# Patient Record
Sex: MALE | Race: WHITE | Employment: FULL TIME | URBAN - METROPOLITAN AREA
[De-identification: names, ages, dates, MRNs, and addresses within clinical notes are randomized per-mention and may not be internally consistent; named-entity substitution may affect disease eponyms.]

---

## 2017-02-05 ENCOUNTER — OFFICE VISIT (OUTPATIENT)
Dept: URGENT CARE | Facility: RETAIL CLINIC | Age: 32
End: 2017-02-05
Payer: COMMERCIAL

## 2017-02-05 VITALS
TEMPERATURE: 98.3 F | OXYGEN SATURATION: 97 % | HEART RATE: 96 BPM | SYSTOLIC BLOOD PRESSURE: 130 MMHG | DIASTOLIC BLOOD PRESSURE: 80 MMHG

## 2017-02-05 DIAGNOSIS — H10.33 ACUTE BACTERIAL CONJUNCTIVITIS OF BOTH EYES: Primary | ICD-10-CM

## 2017-02-05 DIAGNOSIS — J01.90 ACUTE SINUSITIS WITH SYMPTOMS > 10 DAYS: ICD-10-CM

## 2017-02-05 PROCEDURE — 99213 OFFICE O/P EST LOW 20 MIN: CPT | Performed by: PHYSICIAN ASSISTANT

## 2017-02-05 RX ORDER — CIPROFLOXACIN HYDROCHLORIDE 3.5 MG/ML
2 SOLUTION/ DROPS TOPICAL 3 TIMES DAILY
Qty: 5 ML | Refills: 0 | Status: SHIPPED | OUTPATIENT
Start: 2017-02-05 | End: 2017-02-12

## 2017-02-05 NOTE — Clinical Note
[facility]  [address] [facility] [address]  Phone: [phone]    February 5, 2017        Gerson Truong  [id number] [address] [place] [id number]          To whom it may concern:    RE: Gerson Truong    Patient was seen and treated today at our clinic for bacterial pink eye/conjunctivitis. Please excuse from work today 2/5/17 due to being contagious for 24 hrs.    Please contact me for questions or concerns.      Sincerely,        Cyndi Melo PA-C

## 2017-02-05 NOTE — MR AVS SNAPSHOT
After Visit Summary   2/5/2017    Gerson Truong    MRN: [id number]           Patient Information     Date Of Birth          1985        Visit Information        Provider Department      2/5/2017 10:20 AM Cyndi Melo PA-C [facility]        Today's Diagnoses     Acute bacterial conjunctivitis of both eyes    -  1     Acute sinusitis with symptoms > 10 days           Care Instructions    For bacterial pink eye   Use eye drops as directed for 7 days   Out of work/activities for at least 24 hrs due to being contagious.   Handwashing     items touched   Wear Glasses this week if possible  Toss contacts lens  Sterile lens case  Paper towels at all sinks at home  Wipe discharge away with wet paper towel.   Please follow up with primary care provider if not improving, worsening or new symptoms or for any adverse reactions to medications.      For sinusitis/bronchitis  Take antibiotic as directed  Apply warm facial compresses/packs for 5-10 minutes three times daily.  Drink plenty of fluids- 6 to 10 glasses of liquids each day. Rest.  Saline drops or nasal sprays as needed.   Steam treatments or humidifier.  Sudafed (decongestant) for congestion.  Mucinex (guaifenesin) to thin out secretions.  Tylenol or ibuprofen as needed for pain or fever  Follow up at your primary care clinic for increasing pain, high fever, vision changes, worsening symptoms, or no relief from symptoms after 7-10 days.  Please follow up with primary care provider if not improving, worsening or new symptoms or for any adverse reactions to medications.              Follow-ups after your visit        Who to contact     You can reach your care team any time of the day by calling [phone].  Notification of test results:  If you have an abnormal lab result, we will notify you by phone as soon as possible.         Additional Information About Your Visit        MyChart Information     MyChart lets  "you send messages to your doctor, view your test results, renew your prescriptions, schedule appointments and more. To sign up, go to [email or web address] . Click on \"Log in\" on the left side of the screen, which will take you to the Welcome page. Then click on \"Sign up Now\" on the right side of the page.     You will be asked to enter the access code listed below, as well as some personal information. Please follow the directions to create your username and password.     Your access code is: [email or web address]  Expires: 2017 10:40 AM     Your access code will  in 90 days. If you need help or a new code, please call your [facility] clinic or [phone].        Care EveryWhere ID     This is your Care EveryWhere ID. This could be used by other organizations to access your [facility] medical records  [id number]        Your Vitals Were     Pulse Temperature Pulse Oximetry             96 98.3  F (36.8  C) (Oral) 97%          Blood Pressure from Last 3 Encounters:   17 130/80   03/10/15 127/67   14 110/80    Weight from Last 3 Encounters:   14 270 lb 6.4 oz (122.653 kg)   12 282 lb (127.914 kg)              Today, you had the following     No orders found for display         Today's Medication Changes          These changes are accurate as of: 17 10:40 AM.  If you have any questions, ask your nurse or doctor.               Start taking these medicines.        Dose/Directions    amoxicillin-clavulanate 875-125 MG per tablet   Commonly known as:  AUGMENTIN   Used for:  Acute sinusitis with symptoms > 10 days        Dose:  1 tablet   Take 1 tablet by mouth 2 times daily for 10 days   Quantity:  20 tablet   Refills:  0       ciprofloxacin 0.3 % ophthalmic solution   Commonly known as:  CILOXAN   Used for:  Acute bacterial conjunctivitis of both eyes        Dose:  2 drop   Place 2 drops into both eyes 3 times daily for 7 days   Quantity:  5 mL   Refills:  0            Where to get your " medicines      These medications were sent to [id number] - [place], [place] - [address] [facility]  [address] [facility], [facility] [address]     Phone:  [phone]    - amoxicillin-clavulanate 875-125 MG per tablet  - ciprofloxacin 0.3 % ophthalmic solution             Primary Care Provider Office Phone # Fax #    SIOBHAN Patel [phone]       XXX RESIGNED XXX [address] [facility] [address]        Thank you!     Thank you for choosing [facility]  for your care. Our goal is always to provide you with excellent care. Hearing back from our patients is one way we can continue to improve our services. Please take a few minutes to complete the written survey that you may receive in the mail after your visit with us. Thank you!             Your Updated Medication List - Protect others around you: Learn how to safely use, store and throw away your medicines at www.disposemymeds.org.          This list is accurate as of: 2/5/17 10:40 AM.  Always use your most recent med list.                   Brand Name Dispense Instructions for use    amoxicillin-clavulanate 875-125 MG per tablet    AUGMENTIN    20 tablet    Take 1 tablet by mouth 2 times daily for 10 days       ciprofloxacin 0.3 % ophthalmic solution    CILOXAN    5 mL    Place 2 drops into both eyes 3 times daily for 7 days       MUCINEX PO

## 2017-02-05 NOTE — PATIENT INSTRUCTIONS
For bacterial pink eye   Use eye drops as directed for 7 days   Out of work/activities for at least 24 hrs due to being contagious.   Handwashing     items touched   Wear Glasses this week if possible  Toss contacts lens  Sterile lens case  Paper towels at all sinks at home  Wipe discharge away with wet paper towel.   Please follow up with primary care provider if not improving, worsening or new symptoms or for any adverse reactions to medications.      For sinusitis/bronchitis  Take antibiotic as directed  Apply warm facial compresses/packs for 5-10 minutes three times daily.  Drink plenty of fluids- 6 to 10 glasses of liquids each day. Rest.  Saline drops or nasal sprays as needed.   Steam treatments or humidifier.  Sudafed (decongestant) for congestion.  Mucinex (guaifenesin) to thin out secretions.  Tylenol or ibuprofen as needed for pain or fever  Follow up at your primary care clinic for increasing pain, high fever, vision changes, worsening symptoms, or no relief from symptoms after 7-10 days.  Please follow up with primary care provider if not improving, worsening or new symptoms or for any adverse reactions to medications.

## 2017-02-05 NOTE — PROGRESS NOTES
Chief Complaint   Patient presents with     Conjunctivitis     redness, mostly left, but, since yesterday     URI     congestion, weeks, otc meds, coughing        SUBJECTIVE:  Gerson Truong is a 31 year old male who presents complaining of moderate both eyes discharge, mattering, redness (started in left) for 2 day(s).   Onset/timing: worsening.    Associated Signs and Symptoms:sinus pressure/congested/thick nasal discharge x 2 weeks, headache, cough - productive getting worse  Treatment measures tried include: mucinex, OTC cold/congestion  Contact wearer : Yes     No past medical history on file.    Meds - OTC only     No Known Allergies     History   Smoking status     Never Smoker    Smokeless tobacco     Never Used       ROS:  Review of systems negative except as stated above.    OBJECTIVE:  /80 mmHg  Pulse 96  Temp(Src) 98.3  F (36.8  C) (Oral)  SpO2 97%  General: no acute distress  Eye exam: PERRL, EOMs intact, right eye abnormal findings:  discharge and matting noted, left eye abnormal findings: conjunctivitis with erythema, discharge and matting noted.  Ears: normal canals, TMs bilaterally normal   Nose: ABNORMAL - Nasal mucosa erythematous and turbinates swollen. Purulent discharge  Throat - mild erythema with PND  Neck: supple, non-tender, free range of motion, no adenopathy  Heart: NORMAL - regular rate and rhythm without murmur.  Lungs: expiratory rhonchi, no wheezing or rales    ASSESSMENT:  Acute bacterial conjunctivitis of both eyes [H10.33]  Acute sinusitis with symptoms > 10 days [J01.90]    PLAN:  ciprofloxacin (CILOXAN) 0.3 % ophthalmic solution  amoxicillin-clavulanate (AUGMENTIN) 875-125 MG per tablet    For bacterial pink eye   Use eye drops as directed for 7 days   Out of work/activities for at least 24 hrs due to being contagious.   Handwashing     items touched   Wear Glasses this week if possible  Toss contacts lens  Sterile lens case  Paper towels at all sinks at  home  Wipe discharge away with wet paper towel.   Note written for work  Please follow up with primary care provider if not improving, worsening or new symptoms or for any adverse reactions to medications.      For sinusitis/bronchitis  Take antibiotic as directed  Apply warm facial compresses/packs for 5-10 minutes three times daily.  Drink plenty of fluids- 6 to 10 glasses of liquids each day. Rest.  Saline drops or nasal sprays as needed.   Steam treatments or humidifier.  Sudafed (decongestant) for congestion.  Mucinex (guaifenesin) to thin out secretions.  Tylenol or ibuprofen as needed for pain or fever  Follow up at your primary care clinic for increasing pain, high fever, vision changes, worsening symptoms, or no relief from symptoms after 7-10 days.  Please follow up with primary care provider if not improving, worsening or new symptoms or for any adverse reactions to medications.

## 2022-05-13 ENCOUNTER — OFFICE VISIT (OUTPATIENT)
Dept: FAMILY MEDICINE | Facility: CLINIC | Age: 37
End: 2022-05-13
Payer: COMMERCIAL

## 2022-05-13 VITALS
DIASTOLIC BLOOD PRESSURE: 70 MMHG | OXYGEN SATURATION: 96 % | HEART RATE: 80 BPM | SYSTOLIC BLOOD PRESSURE: 106 MMHG | BODY MASS INDEX: 32.23 KG/M2 | TEMPERATURE: 98.1 F | HEIGHT: 72 IN | WEIGHT: 238 LBS | RESPIRATION RATE: 10 BRPM

## 2022-05-13 DIAGNOSIS — Z11.4 SCREENING FOR HIV (HUMAN IMMUNODEFICIENCY VIRUS): ICD-10-CM

## 2022-05-13 DIAGNOSIS — Z00.00 ROUTINE GENERAL MEDICAL EXAMINATION AT A HEALTH CARE FACILITY: Primary | ICD-10-CM

## 2022-05-13 DIAGNOSIS — Z11.59 NEED FOR HEPATITIS C SCREENING TEST: ICD-10-CM

## 2022-05-13 DIAGNOSIS — Z13.220 SCREENING FOR HYPERLIPIDEMIA: ICD-10-CM

## 2022-05-13 LAB
CHOLEST SERPL-MCNC: 211 MG/DL
FASTING STATUS PATIENT QL REPORTED: YES
HCV AB SERPL QL IA: NONREACTIVE
HDLC SERPL-MCNC: 45 MG/DL
HIV 1+2 AB+HIV1 P24 AG SERPL QL IA: NONREACTIVE
LDLC SERPL CALC-MCNC: 143 MG/DL
NONHDLC SERPL-MCNC: 166 MG/DL
TRIGL SERPL-MCNC: 117 MG/DL

## 2022-05-13 PROCEDURE — 36415 COLL VENOUS BLD VENIPUNCTURE: CPT | Performed by: STUDENT IN AN ORGANIZED HEALTH CARE EDUCATION/TRAINING PROGRAM

## 2022-05-13 PROCEDURE — 99385 PREV VISIT NEW AGE 18-39: CPT | Performed by: STUDENT IN AN ORGANIZED HEALTH CARE EDUCATION/TRAINING PROGRAM

## 2022-05-13 PROCEDURE — 80061 LIPID PANEL: CPT | Performed by: STUDENT IN AN ORGANIZED HEALTH CARE EDUCATION/TRAINING PROGRAM

## 2022-05-13 PROCEDURE — 86803 HEPATITIS C AB TEST: CPT | Performed by: STUDENT IN AN ORGANIZED HEALTH CARE EDUCATION/TRAINING PROGRAM

## 2022-05-13 PROCEDURE — 87389 HIV-1 AG W/HIV-1&-2 AB AG IA: CPT | Performed by: STUDENT IN AN ORGANIZED HEALTH CARE EDUCATION/TRAINING PROGRAM

## 2022-05-13 ASSESSMENT — ENCOUNTER SYMPTOMS
ARTHRALGIAS: 0
CHILLS: 0
PARESTHESIAS: 0
HEARTBURN: 0
SHORTNESS OF BREATH: 0
CONSTIPATION: 0
ABDOMINAL PAIN: 0
EYE PAIN: 0
DIZZINESS: 0
NERVOUS/ANXIOUS: 0
FREQUENCY: 0
NAUSEA: 0
PALPITATIONS: 0
HEADACHES: 0
DYSURIA: 0
WEAKNESS: 0
MYALGIAS: 0
FEVER: 0
HEMATOCHEZIA: 0
HEMATURIA: 0
COUGH: 0
SORE THROAT: 0
DIARRHEA: 0
JOINT SWELLING: 0

## 2022-05-13 NOTE — PROGRESS NOTES
SUBJECTIVE:   CC: Gerson Truong is an 37 year old male who presents for preventative health visit.       Patient has been advised of split billing requirements and indicates understanding: Yes  Healthy Habits:     Getting at least 3 servings of Calcium per day:  Yes    Bi-annual eye exam:  Yes    Dental care twice a year:  Yes    Sleep apnea or symptoms of sleep apnea:  None    Diet:  Regular (no restrictions)    Frequency of exercise:  6-7 days/week    Duration of exercise:  45-60 minutes    Taking medications regularly:  Not Applicable    Barriers to taking medications:  None    PHQ-2 Total Score: 2    Additional concerns today:  No              Today's PHQ-2 Score:   PHQ-2 ( 1999 [employer]) 5/13/2022   Q1: Little interest or pleasure in doing things 1   Q2: Feeling down, depressed or hopeless 1   PHQ-2 Score 2   Q1: Little interest or pleasure in doing things Several days   Q2: Feeling down, depressed or hopeless Several days   PHQ-2 Score 2     Reports mood to be stable currently.  Consider counseling or follow-up if things worsen or change.    Abuse: Current or Past(Physical, Sexual or Emotional)- No  Do you feel safe in your environment? Yes    Have you ever done Advance Care Planning? (For example, a Health Directive, POLST, or a discussion with a medical provider or your loved ones about your wishes): No, advance care planning information given to patient to review.  Patient declined advance care planning discussion at this time.    Social History     Tobacco Use     Smoking status: Never Smoker     Smokeless tobacco: Never Used   Substance Use Topics     Alcohol use: No         Alcohol Use 5/13/2022   Prescreen: >3 drinks/day or >7 drinks/week? No   Prescreen: >3 drinks/day or >7 drinks/week? -       Last PSA: No results found for: PSA    Reviewed orders with patient. Reviewed health maintenance and updated orders accordingly - Yes  Lab work is in process    Reviewed and updated as needed this visit by  "clinical staff   Tobacco  Allergies  Meds   Med Hx  Surg Hx  Fam Hx  Soc Hx          Reviewed and updated as needed this visit by Provider                   History reviewed. No pertinent past medical history.   Past Surgical History:   Procedure Laterality Date     ABDOMEN SURGERY  2010    appendix removed       Review of Systems   Constitutional: Negative for chills and fever.   HENT: Negative for congestion, ear pain, hearing loss and sore throat.    Eyes: Negative for pain and visual disturbance.   Respiratory: Negative for cough and shortness of breath.    Cardiovascular: Negative for chest pain, palpitations and peripheral edema.   Gastrointestinal: Negative for abdominal pain, constipation, diarrhea, heartburn, hematochezia and nausea.   Genitourinary: Negative for dysuria, frequency, genital sores, hematuria, impotence, penile discharge and urgency.   Musculoskeletal: Negative for arthralgias, joint swelling and myalgias.   Skin: Negative for rash.   Neurological: Negative for dizziness, weakness, headaches and paresthesias.   Psychiatric/Behavioral: Negative for mood changes. The patient is not nervous/anxious.      OBJECTIVE:   /70   Pulse 80   Temp 98.1  F (36.7  C)   Resp 10   Ht 1.84 m (6' 0.44\")   Wt 108 kg (238 lb)   SpO2 96%   BMI 31.89 kg/m      Physical Exam  GENERAL: healthy, alert and no distress  EYES: Eyes grossly normal to inspection, PERRL and conjunctivae and sclerae normal  HENT: ear canals and TM's normal, nose and mouth without ulcers or lesions  NECK: no adenopathy, no asymmetry, masses, or scars and thyroid normal to palpation  RESP: lungs clear to auscultation - no rales, rhonchi or wheezes  CV: regular rate and rhythm, normal S1 S2, no S3 or S4, no murmur, click or rub, no peripheral edema and peripheral pulses strong  ABDOMEN: soft, nontender, no hepatosplenomegaly, no masses and bowel sounds normal  MS: no gross musculoskeletal defects noted, no edema  SKIN: no " "suspicious lesions or rashes  NEURO: Normal strength and tone, mentation intact and speech normal  PSYCH: mentation appears normal, affect normal/bright      ASSESSMENT/PLAN:   Gerson was seen today for physical.    Diagnoses and all orders for this visit:    Routine general medical examination at a health care facility    Screening for hyperlipidemia  -     Lipid panel reflex to direct LDL Fasting; Future  -     Lipid panel reflex to direct LDL Fasting    Screening for HIV (human immunodeficiency virus)  -     HIV Antigen Antibody Combo; Future  -     HIV Antigen Antibody Combo    Need for hepatitis C screening test  -     Hepatitis C Screen Reflex to HCV RNA Quant and Genotype; Future  -     Hepatitis C Screen Reflex to HCV RNA Quant and Genotype    Other orders  -     REVIEW OF HEALTH MAINTENANCE PROTOCOL ORDERS        Patient has been advised of split billing requirements and indicates understanding: Yes    COUNSELING:   Reviewed preventive health counseling, as reflected in patient instructions       Regular exercise       Healthy diet/nutrition       Vision screening       Hearing screening       Alcohol Use        Colorectal cancer screening       Prostate cancer screening    Estimated body mass index is 31.89 kg/m  as calculated from the following:    Height as of this encounter: 1.84 m (6' 0.44\").    Weight as of this encounter: 108 kg (238 lb).     Weight management plan: Discussed healthy diet and exercise guidelines    He reports that he has never smoked. He has never used smokeless tobacco.      Counseling Resources:  ATP IV Guidelines  Pooled Cohorts Equation Calculator  FRAX Risk Assessment  ICSI Preventive Guidelines  Dietary Guidelines for Americans, 2010  USDA's MyPlate  ASA Prophylaxis  Lung CA Screening    Tevin Marie MD  [facility]  "

## 2022-09-03 ENCOUNTER — HEALTH MAINTENANCE LETTER (OUTPATIENT)
Age: 37
End: 2022-09-03

## 2023-04-20 ENCOUNTER — PATIENT OUTREACH (OUTPATIENT)
Dept: CARE COORDINATION | Facility: CLINIC | Age: 38
End: 2023-04-20
Payer: COMMERCIAL

## 2023-05-04 ENCOUNTER — PATIENT OUTREACH (OUTPATIENT)
Dept: CARE COORDINATION | Facility: CLINIC | Age: 38
End: 2023-05-04
Payer: COMMERCIAL

## 2023-07-22 ENCOUNTER — HEALTH MAINTENANCE LETTER (OUTPATIENT)
Age: 38
End: 2023-07-22

## 2024-09-14 ENCOUNTER — HEALTH MAINTENANCE LETTER (OUTPATIENT)
Age: 39
End: 2024-09-14